# Patient Record
Sex: MALE | Race: WHITE
[De-identification: names, ages, dates, MRNs, and addresses within clinical notes are randomized per-mention and may not be internally consistent; named-entity substitution may affect disease eponyms.]

---

## 2018-04-03 ENCOUNTER — HOSPITAL ENCOUNTER (EMERGENCY)
Dept: HOSPITAL 17 - PHEFT | Age: 58
Discharge: HOME | End: 2018-04-03
Payer: COMMERCIAL

## 2018-04-03 VITALS
DIASTOLIC BLOOD PRESSURE: 71 MMHG | OXYGEN SATURATION: 95 % | RESPIRATION RATE: 22 BRPM | TEMPERATURE: 99.5 F | HEART RATE: 103 BPM | SYSTOLIC BLOOD PRESSURE: 123 MMHG

## 2018-04-03 VITALS — HEIGHT: 70 IN | BODY MASS INDEX: 45.1 KG/M2 | WEIGHT: 315 LBS

## 2018-04-03 DIAGNOSIS — K21.9: ICD-10-CM

## 2018-04-03 DIAGNOSIS — Z72.0: ICD-10-CM

## 2018-04-03 DIAGNOSIS — I10: ICD-10-CM

## 2018-04-03 DIAGNOSIS — F41.9: ICD-10-CM

## 2018-04-03 DIAGNOSIS — L02.415: Primary | ICD-10-CM

## 2018-04-03 DIAGNOSIS — Z88.0: ICD-10-CM

## 2018-04-03 DIAGNOSIS — E78.00: ICD-10-CM

## 2018-04-03 PROCEDURE — 87205 SMEAR GRAM STAIN: CPT

## 2018-04-03 PROCEDURE — 10061 I&D ABSCESS COMP/MULTIPLE: CPT

## 2018-04-03 PROCEDURE — 86403 PARTICLE AGGLUT ANTBDY SCRN: CPT

## 2018-04-03 PROCEDURE — 87070 CULTURE OTHR SPECIMN AEROBIC: CPT

## 2018-04-03 NOTE — PD
HPI


Chief Complaint:  Skin Problem


Time Seen by Provider:  10:02


Travel History


International Travel<30 days:  No


Contact w/Intl Traveler<30days:  No


Traveled to known affect area:  No





History of Present Illness


HPI


57-year-old male presents to the ED for evaluation of 2 day history of red, 

hardened, tender area of the right inner thigh.  Pain is rated 9/10, 

exacerbated by certain motions.  No alleviating factors reported.  Gradual 

onset.  Patient can identify no injury.  He denies fever, chills, nausea, 

vomiting.  Denies history of MRSA.  He treated at home with warm compresses 

with no improvement of symptoms.





[facility]


Past Medical History


Anxiety:  Yes


Cardiovascular Problems:  Yes (VALVE REGURGITATION)


High Cholesterol:  Yes


Diminished Hearing:  No


GERD:  Yes


Hypertension:  Yes


Pregnant?:  Not Pregnant





Past Surgical History


Abdominal Surgery:  Yes (HERNIA REPAIR AS AN INFANT)


Other Surgery:  Yes (BACK 1998)





Social History


Alcohol Use:  Yes (6 PACK DAILY)


Tobacco Use:  Yes


Substance Use:  No





Allergies-Medications


(Allergen,Severity, Reaction):  


Coded Allergies:  


     penicillin G (Unverified  Allergy, Severe, UNKNOWN, 4/3/18)


Reported Meds & Prescriptions





Reported Meds & Active Scripts


Active


Clindamycin (Clindamycin HCl) 300 Mg Cap 300 Mg PO TID 7 Days


Reported


[Cholesterol Med]     


[Htn Meds]     


Aspirin 81 Mg Chew 81 Mg CHEW DAILY








Review of Systems


Except as stated in HPI:  all other systems reviewed are Neg





Physical Exam


Narrative


GENERAL: Well-nourished, well-developed white male in no acute distress.


SKIN: Focused skin assessment warm/dry.  Chronic skin changes in bilateral 

inner thighs.


SKIN: There is an indurated area in the right inner thigh which measures about 

5 cm in diameter. It is fluctuant but there is no pointing or drainage. There 

is a zone of inflammation around it but no lymphangitis.


HEAD: Normocephalic.


EYES: No scleral icterus. No injection or drainage. 


NECK: Supple, trachea midline. No JVD or lymphadenopathy.


CARDIOVASCULAR: Regular rate and rhythm without murmurs, gallops, or rubs. 


RESPIRATORY: Breath sounds equal bilaterally. No accessory muscle use.


GASTROINTESTINAL: Abdomen soft, non-tender, nondistended. 


MUSCULOSKELETAL: No cyanosis, or edema. 


BACK: Nontender without obvious deformity. No CVA tenderness.





Data


Data


Last Documented VS





Vital Signs








  Date Time  Temp Pulse Resp B/P (MAP) Pulse Ox O2 Delivery O2 Flow Rate FiO2


 


4/3/18 09:18 99.5 103 22 123/71 (88) 95   








Orders





 Orders


Lidocaine Pf 1% Inj (Xylocaine-Mpf 1% In (4/3/18 10:15)


Abscess Culture And Gram Stain (4/3/18 10:08)


Ed Discharge Order (4/3/18 10:48)


Ibuprofen (Motrin) (4/3/18 11:00)








MDM


Medical Decision Making


Medical Screen Exam Complete:  Yes


Emergency Medical Condition:  Yes


Differential Diagnosis


Folliculitis versus cellulitis versus abscess versus other


Narrative Course


57-year-old male presents to the ED for evaluation of 2 day history of red, 

hardened, tender area of the right inner thigh.  No history of MRSA.  Vitals 

reviewed.  Physical exam consistent with abscess.  Abscess I&D was performed.  

Patient's prescribed clindamycin 450 3 times daily 7 days.  He is instructed 

to begin the antibiotics today, keep the wound clean and dry, return to the ED 

in 2 days for packing removal and wound recheck.  He indicated understanding of 

the instructions and is agreeable to the care plan.  He stable and discharged 

home.





Procedures


**Procedure Narrative**


INCISION AND DRAINAGE OF ABSCESS: The area was prepped and was sterilely 

draped.  A subcutaneous wheal of 1 % Xylocaine with a total number 4 mL was 

used to anesthetize the area properly.  A number 11 scalpel was used to make a 1

-cm incision across the area of the abscess.  The abscess was drained, complex 

loculations were broken down, and irrigated with normal saline.  Cultures were 

obtained.  Quarter inch iodoform packing was placed in the wound. Sterile 

dressing applied. Patient advised to have packing removed in two days.





Diagnosis





 Primary Impression:  


 Abscess of right thigh


Referrals:  


Primary Care Physician





***Additional Instructions:  


Rest, hydrate.


Keep the wound clean, dry and covered.


Take the antibiotics as they are prescribed, even if your symptoms resolved.


Utilize over-the-counter pain medications, as described on the label, as needed.


Return to the ED in 48 hours for packing removal and wound recheck.


***Med/Other Pt SpecificInfo:  Prescription(s) given


Scripts


Clindamycin (Clindamycin) 300 Mg Cap


300 MG PO TID for Infection for 7 Days, CAP 0 Refills


   Prov: Tenzin Phillips MD         4/3/18


Disposition:  01 DISCHARGE HOME


Condition:  Stable











Leelee Chisholm Apr 3, 2018 10:15

## 2018-04-05 ENCOUNTER — HOSPITAL ENCOUNTER (EMERGENCY)
Dept: HOSPITAL 17 - PHED | Age: 58
Discharge: HOME | End: 2018-04-05
Payer: COMMERCIAL

## 2018-04-05 VITALS — BODY MASS INDEX: 45.1 KG/M2 | WEIGHT: 315 LBS | HEIGHT: 70 IN

## 2018-04-05 VITALS
HEART RATE: 88 BPM | TEMPERATURE: 98.6 F | SYSTOLIC BLOOD PRESSURE: 133 MMHG | OXYGEN SATURATION: 97 % | DIASTOLIC BLOOD PRESSURE: 79 MMHG | RESPIRATION RATE: 18 BRPM

## 2018-04-05 DIAGNOSIS — Z48.01: ICD-10-CM

## 2018-04-05 DIAGNOSIS — L02.415: Primary | ICD-10-CM

## 2018-04-05 PROCEDURE — 99281 EMR DPT VST MAYX REQ PHY/QHP: CPT

## 2018-04-05 NOTE — PD
HPI


Chief Complaint:  Packing removal


Time Seen by Provider:  06:14


Travel History


International Travel<30 days:  No


Contact w/Intl Traveler<30days:  No


Traveled to known affect area:  No





History of Present Illness


HPI


The patient is a 57-year-old male that had an abscess on his right medial thigh 

which was drained on April 3.  A packing was put in and he comes in today to 

have the packing removed.  It is doing well at home.





[facility]


Past Medical History


Anxiety:  Yes


Cardiovascular Problems:  Yes (VALVE REGURGITATION)


High Cholesterol:  Yes


Diminished Hearing:  No


GERD:  Yes


Hypertension:  Yes





Past Surgical History


Abdominal Surgery:  Yes (HERNIA REPAIR AS AN INFANT)


Other Surgery:  Yes (BACK 1998)





Social History


Alcohol Use:  Yes (6 PACK DAILY)


Tobacco Use:  Yes


Substance Use:  No





Allergies-Medications


(Allergen,Severity, Reaction):  


Coded Allergies:  


     penicillin G (Unverified  Allergy, Severe, UNKNOWN, 4/3/18)


Reported Meds & Prescriptions





Reported Meds & Active Scripts


Active


Clindamycin (Clindamycin HCl) 300 Mg Cap 300 Mg PO TID 7 Days


Reported


[Cholesterol Med]     


[Htn Meds]     


Aspirin 81 Mg Chew 81 Mg CHEW DAILY








Review of Systems


Except as stated in HPI:  all other systems reviewed are Neg





Physical Exam


Narrative


GENERAL: Well-nourished, well-developed patient in minimal apparent distress 

with his right thigh abscess.  His vital signs are normal.


SKIN: Focused skin assessment warm/dry.  There is almost no erythema around the 

abscess.  The packing was removed.  This was not painful and a slight amount of 

pus was present.


HEAD: Normocephalic.


EYES: No scleral icterus. No injection or drainage. 


NECK: Supple, trachea midline. No JVD or lymphadenopathy.


CARDIOVASCULAR: Regular rate and rhythm without murmurs, gallops, or rubs. 


RESPIRATORY: Breath sounds equal bilaterally. No accessory muscle use.


GASTROINTESTINAL: Abdomen soft, non-tender, nondistended. 


MUSCULOSKELETAL: No cyanosis, or edema. 


BACK: Nontender without obvious deformity. No CVA tenderness.





Data


Data


Last Documented VS





Vital Signs








  Date Time  Temp Pulse Resp B/P (MAP) Pulse Ox O2 Delivery O2 Flow Rate FiO2


 


4/5/18 06:08 98.6 88 18 133/79 (97) 97   











MDM


Medical Decision Making


Medical Screen Exam Complete:  Yes


Emergency Medical Condition:  Yes


Medical Record Reviewed:  Yes


Differential Diagnosis


Resolving abscess, cellulitis with abscess, worsening abscess


Narrative Course


The patient appears to have a resolving abscess.  He is hardly tender and there 

is minimal swelling around the abscess cavity.  The packing was removed.





Diagnosis





 Primary Impression:  


 Abscess packing removal





***Additional Instructions:  


Sit in a tub of warm water today.  Do this about 3 or 4 times a day.  We will 

write you a work excuse.  For the next week or so, after today, soak it once 

daily.  If worse, please return to emergency department.  Continue your 

antibiotic medications


***Med/Other Pt SpecificInfo:  No Change to Meds


Disposition:  01 DISCHARGE HOME


Condition:  Stable











Ricardo Grimm MD Apr 5, 2018 06:24